# Patient Record
Sex: FEMALE | Race: WHITE
[De-identification: names, ages, dates, MRNs, and addresses within clinical notes are randomized per-mention and may not be internally consistent; named-entity substitution may affect disease eponyms.]

---

## 2017-09-25 ENCOUNTER — HOSPITAL ENCOUNTER (EMERGENCY)
Dept: HOSPITAL 62 - ER | Age: 52
Discharge: HOME | End: 2017-09-25
Payer: SELF-PAY

## 2017-09-25 VITALS — SYSTOLIC BLOOD PRESSURE: 128 MMHG | DIASTOLIC BLOOD PRESSURE: 76 MMHG

## 2017-09-25 DIAGNOSIS — E11.9: ICD-10-CM

## 2017-09-25 DIAGNOSIS — S80.811A: Primary | ICD-10-CM

## 2017-09-25 DIAGNOSIS — I10: ICD-10-CM

## 2017-09-25 DIAGNOSIS — I25.10: ICD-10-CM

## 2017-09-25 DIAGNOSIS — S80.812A: ICD-10-CM

## 2017-09-25 DIAGNOSIS — I25.2: ICD-10-CM
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
## 2017-09-25 PROCEDURE — 99283 EMERGENCY DEPT VISIT LOW MDM: CPT

## 2017-09-25 NOTE — RADIOLOGY REPORT (SQ)
EXAM DESCRIPTION:  TIBIA FIBULA RIGHT



COMPLETED DATE/TIME:  9/25/2017 10:09 am



REASON FOR STUDY:  injury on brick



COMPARISON:  None.



NUMBER OF VIEWS:  Two views.



TECHNIQUE:  Two radiographic images acquired of the right tibia and fibula to include the knee and an
kle in at least one projection.



LIMITATIONS:  None.



FINDINGS:  MINERALIZATION: Normal.

BONES: No acute fracture or dislocation.  No worrisome bone lesions.

SOFT TISSUES: No metallic foreign bodies.

OTHER: No other significant finding.



IMPRESSION:  No acute fracture identified.



TECHNICAL DOCUMENTATION:  JOB ID:  [id number]

 2011 [employer]- All Rights Reserved

## 2019-09-08 ENCOUNTER — HOSPITAL ENCOUNTER (EMERGENCY)
Dept: HOSPITAL 62 - ER | Age: 54
LOS: 1 days | Discharge: HOME | End: 2019-09-09
Payer: SELF-PAY

## 2019-09-08 DIAGNOSIS — M79.605: Primary | ICD-10-CM

## 2019-09-08 DIAGNOSIS — I25.10: ICD-10-CM

## 2019-09-08 DIAGNOSIS — I10: ICD-10-CM

## 2019-09-08 DIAGNOSIS — Z79.02: ICD-10-CM

## 2019-09-08 DIAGNOSIS — E11.9: ICD-10-CM

## 2019-09-08 DIAGNOSIS — M79.89: ICD-10-CM

## 2019-09-08 PROCEDURE — 99283 EMERGENCY DEPT VISIT LOW MDM: CPT

## 2019-09-09 ENCOUNTER — HOSPITAL ENCOUNTER (OUTPATIENT)
Dept: HOSPITAL 62 - RAD | Age: 54
End: 2019-09-09
Attending: NURSE PRACTITIONER
Payer: SELF-PAY

## 2019-09-09 VITALS — DIASTOLIC BLOOD PRESSURE: 78 MMHG | SYSTOLIC BLOOD PRESSURE: 130 MMHG

## 2019-09-09 DIAGNOSIS — M79.89: ICD-10-CM

## 2019-09-09 DIAGNOSIS — M79.605: Primary | ICD-10-CM

## 2019-09-09 PROCEDURE — 93971 EXTREMITY STUDY: CPT

## 2019-09-09 NOTE — XCELERA REPORT
[address] [place]

                             [facility] [address]

                               Tel: [phone]

                               Fax: [phone]



                       Lower Extremity Venous Evaluation

_______________________________________________________________________________



_______________________________________________________________________________

Procedure: Color flow and duplex imaging of the veins of the left lower

extremity as well as the right Common Femoral vein.





_______________________________________________________________________________



_______________________________________________________________________________



Right Sided Venous Evaluation

The right common femoral vein is fully compressible. Spontaneous and phasic

flow is present in the right common femoral vein.



Left Sided Venous Evaluation

Lucent enlarged veins in the distal Greater Saphenous vein. No Colour flow.

Otherwise normal vessel filling wall to wall, compression and augmentation as

well as Colour flow down to the infrageniculate veins.



_______________________________________________________________________________



Interpretation Summary

No duplex evidence of DVT or obstruction in the left lower extremity nor in

the right Common Femoral vein. Superficial phlebitis, acute, in the left

Greater Saphenus vein.

_______________________________________________________________________________



Name: PERCY DUPREE

MRN: [id number]

Age: 54 yrs

Gender: Female                                        : 1965

Patient Status: Outpatient                            Patient Location: RAD

Account #: [id number]

Study Date: 2019 09:27 AM

                          Accession #: [id number]

Reason For Study: LLE SWELLING

Ordering Physician: DION WILLIAM

Performed By: Jennifer Tesfaye

Electronically signed by:      Lennox Williams      on 2019 10:35 AM



CC: DION WILLIAM

>

Williams, Lennox
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
